# Patient Record
Sex: FEMALE | Race: BLACK OR AFRICAN AMERICAN | NOT HISPANIC OR LATINO | Employment: PART TIME | URBAN - METROPOLITAN AREA
[De-identification: names, ages, dates, MRNs, and addresses within clinical notes are randomized per-mention and may not be internally consistent; named-entity substitution may affect disease eponyms.]

---

## 2022-12-20 DIAGNOSIS — D72.820 LYMPHOCYTOSIS: Primary | ICD-10-CM

## 2023-01-30 PROBLEM — D72.820 LYMPHOCYTOSIS: Status: ACTIVE | Noted: 2023-01-30

## 2023-03-07 ENCOUNTER — OFFICE VISIT (OUTPATIENT)
Dept: HEMATOLOGY/ONCOLOGY | Facility: CLINIC | Age: 48
End: 2023-03-07
Payer: MEDICAID

## 2023-03-07 VITALS
DIASTOLIC BLOOD PRESSURE: 92 MMHG | BODY MASS INDEX: 53.92 KG/M2 | RESPIRATION RATE: 18 BRPM | HEIGHT: 62 IN | HEART RATE: 113 BPM | OXYGEN SATURATION: 95 % | WEIGHT: 293 LBS | SYSTOLIC BLOOD PRESSURE: 148 MMHG | TEMPERATURE: 99 F

## 2023-03-07 DIAGNOSIS — D72.820 LYMPHOCYTOSIS: ICD-10-CM

## 2023-03-07 PROCEDURE — 3008F BODY MASS INDEX DOCD: CPT | Mod: CPTII,,, | Performed by: STUDENT IN AN ORGANIZED HEALTH CARE EDUCATION/TRAINING PROGRAM

## 2023-03-07 PROCEDURE — 99204 PR OFFICE/OUTPT VISIT, NEW, LEVL IV, 45-59 MIN: ICD-10-PCS | Mod: ,,, | Performed by: STUDENT IN AN ORGANIZED HEALTH CARE EDUCATION/TRAINING PROGRAM

## 2023-03-07 PROCEDURE — 3080F DIAST BP >= 90 MM HG: CPT | Mod: CPTII,,, | Performed by: STUDENT IN AN ORGANIZED HEALTH CARE EDUCATION/TRAINING PROGRAM

## 2023-03-07 PROCEDURE — 3008F PR BODY MASS INDEX (BMI) DOCUMENTED: ICD-10-PCS | Mod: CPTII,,, | Performed by: STUDENT IN AN ORGANIZED HEALTH CARE EDUCATION/TRAINING PROGRAM

## 2023-03-07 PROCEDURE — 3077F PR MOST RECENT SYSTOLIC BLOOD PRESSURE >= 140 MM HG: ICD-10-PCS | Mod: CPTII,,, | Performed by: STUDENT IN AN ORGANIZED HEALTH CARE EDUCATION/TRAINING PROGRAM

## 2023-03-07 PROCEDURE — 1159F PR MEDICATION LIST DOCUMENTED IN MEDICAL RECORD: ICD-10-PCS | Mod: CPTII,,, | Performed by: STUDENT IN AN ORGANIZED HEALTH CARE EDUCATION/TRAINING PROGRAM

## 2023-03-07 PROCEDURE — 3080F PR MOST RECENT DIASTOLIC BLOOD PRESSURE >= 90 MM HG: ICD-10-PCS | Mod: CPTII,,, | Performed by: STUDENT IN AN ORGANIZED HEALTH CARE EDUCATION/TRAINING PROGRAM

## 2023-03-07 PROCEDURE — 99204 OFFICE O/P NEW MOD 45 MIN: CPT | Mod: ,,, | Performed by: STUDENT IN AN ORGANIZED HEALTH CARE EDUCATION/TRAINING PROGRAM

## 2023-03-07 PROCEDURE — 3077F SYST BP >= 140 MM HG: CPT | Mod: CPTII,,, | Performed by: STUDENT IN AN ORGANIZED HEALTH CARE EDUCATION/TRAINING PROGRAM

## 2023-03-07 PROCEDURE — 1159F MED LIST DOCD IN RCRD: CPT | Mod: CPTII,,, | Performed by: STUDENT IN AN ORGANIZED HEALTH CARE EDUCATION/TRAINING PROGRAM

## 2023-03-07 RX ORDER — BENAZEPRIL HYDROCHLORIDE 20 MG/1
1 TABLET ORAL
COMMUNITY

## 2023-03-07 RX ORDER — GABAPENTIN 100 MG/1
1 CAPSULE ORAL EVERY MORNING
COMMUNITY

## 2023-03-07 RX ORDER — AMLODIPINE BESYLATE 5 MG/1
1 TABLET ORAL
COMMUNITY
Start: 2022-12-13

## 2023-03-07 RX ORDER — OXYBUTYNIN CHLORIDE 5 MG/1
1 TABLET ORAL
COMMUNITY
Start: 2022-12-13 | End: 2023-06-11

## 2023-03-07 RX ORDER — GABAPENTIN 300 MG/1
CAPSULE ORAL
COMMUNITY

## 2023-03-07 RX ORDER — ATORVASTATIN CALCIUM 40 MG/1
1 TABLET, FILM COATED ORAL
COMMUNITY

## 2023-03-07 RX ORDER — DICLOFENAC SODIUM 75 MG/1
1 TABLET, DELAYED RELEASE ORAL
COMMUNITY

## 2023-03-07 NOTE — PROGRESS NOTES
Referring provider: Krista Brink NP  Reason for consultation:  Lymphocytosis     Patient is a 47-year-old with history of hypertension, hyperlipidemia and arthritis who presented clinic to establish care for a diagnosis of lymphocytosis.  Patient had blood work done with her PCP's office in December 22 and was found to have elevated WBC count 11.7 with a predominantly lymphocytic component prompting this referral.  She presents to clinic on 03/07/2023 to establish care and discuss further treatment recommendations.      Today, 03/07/2023, patient denies any acute concerns.  She denies any fevers, chills, drenching night sweats, decreased appetite or weight loss.  She does report chronic pain and attributes it to arthritis.  She denies any ER or hospital visits in the recent past.  She denies any fevers, dysuria, urinary urgency, frequency, diarrhea or skin ulcers      Patient is a current smoker, reports smoking 2 cigarettes per day, denies alcohol or recreational drug use.  She works as a CNA.  ECOG 0.  Denies any family history of malignancy or blood disorders.      History reviewed. No pertinent past medical history.    History reviewed. No pertinent surgical history.    Family History   Problem Relation Age of Onset    Diabetes Mother     Hyperlipidemia Mother     Hypertension Mother        Social History     Socioeconomic History    Marital status: Single       Current Outpatient Medications   Medication Sig Dispense Refill    amLODIPine (NORVASC) 5 MG tablet 1 tablet.      atorvastatin (LIPITOR) 40 MG tablet 1 tablet.      benazepriL (LOTENSIN) 20 MG tablet 1 tablet.      diclofenac (VOLTAREN) 75 MG EC tablet 1 tablet.      gabapentin (NEURONTIN) 100 MG capsule 1 capsule every morning.      gabapentin (NEURONTIN) 300 MG capsule 1 capsule in the evening      oxybutynin (DITROPAN) 5 MG Tab 1 tablet.       No current facility-administered medications for this visit.       Review of patient's allergies  indicates:  Not on File      Review of systems   CONSTITUTIONAL: no fevers, no chills, no weight loss, no fatigue, no weakness  HEMATOLOGIC: no abnormal bleeding, no abnormal bruising, no drenching night sweats  ONCOLOGIC: no new masses or lumps  HEENT: no vision loss, no tinnitus or hearing loss, no nose bleeding, no dysphagia, no odynophagia  CVS: no chest pain, no palpitations, no dyspnea on exertion  RESP: no shortness of breath, no hemoptysis, no cough  GI: no nausea, no vomiting, no diarrhea, no constipation, no melena, no hematochezia, no hematemesis, no abdominal pain, no increase in abdominal girth  : no dysuria, no hematuria, no hesitancy, no scrotal swelling, no discharge  INTEGUMENT: no rashes, no abnormal bruising, no nail pitting, no hyperpigmentation  NEURO: no falls, no memory loss, no paresthesias or dysesthesias, no urofecal incontinence or retention, no loss of strength on any extremity  MSK: no back pain, + joint pain, no joint swelling  PSYCH: no suicidal or homicidal ideation, no depression, no insomnia, no anhedonia  ENDOCRINE: no heat or cold intolerance, no polyuria, no polydipsia        Physical Exam:  Vitals:    03/07/23 1513   BP: (!) 148/92   Pulse: (!) 113   Resp: 18   Temp: 98.5 °F (36.9 °C)       ECOG PS 0  GA: AAOx3, NAD, morbidly obese  HEENT: NCAT, PERRLA, EOMI, good dentition, moist oral mucous membranes  LYMPH: no cervical, axillary or supraclavicular adenopathy  CVS: s1s2 RRR, no M/R/G  RESP: CTA b/l, no crackles, no wheezes or rhonchi  ABD: soft, NT, ND, BS+, no hepatosplenomegaly  EXT: no deformities, no pedal edema  SKIN: no rashes, warm and dry  NEURO: normal mentation, strength 5/5 on all 4 extremities, no sensory deficits        Assessment and plan     # lymphocytosis  Noted CBC with mild elevation in WBC count at PCP's office in December 22 .  Noted normal hemoglobin level and mild elevation platelet count.  Noted path review of smear with evidence of lymphocytosis,  likely reactive in nature   Patient denies any constitutional symptoms  Discussed with patient the diagnosis of lymphocytosis, reactive versus primary causes and the concern for reactive thrombocytosis in her case   Discussed the plan to obtain further blood work to narrow down her cause of lymphocytosis which will dictate further management.      Plan   CBC, CMP, ESR, CRP, flow cytometry today   Plan to follow-up in 2-3 weeks to discuss above workup and further treatment recommendations.        A total of  45 minutes were spent in review of records, interpretation of test, coordination of care, discussion and counseling with the patient.        Portions of the record may have been created with voice recognition software. Occasional wrong-word or sound-a-like substitutions may have occurred due to the inherent limitations of voice recognition software. Read the chart carefully and recognize, using context, where substitutions have occurred.